# Patient Record
Sex: FEMALE | Race: WHITE | NOT HISPANIC OR LATINO | Employment: FULL TIME | URBAN - METROPOLITAN AREA
[De-identification: names, ages, dates, MRNs, and addresses within clinical notes are randomized per-mention and may not be internally consistent; named-entity substitution may affect disease eponyms.]

---

## 2022-01-27 ENCOUNTER — OFFICE VISIT (OUTPATIENT)
Dept: BARIATRICS | Facility: CLINIC | Age: 62
End: 2022-01-27
Payer: COMMERCIAL

## 2022-01-27 VITALS
HEART RATE: 71 BPM | DIASTOLIC BLOOD PRESSURE: 77 MMHG | RESPIRATION RATE: 16 BRPM | HEIGHT: 65 IN | WEIGHT: 265.3 LBS | BODY MASS INDEX: 44.2 KG/M2 | SYSTOLIC BLOOD PRESSURE: 137 MMHG | TEMPERATURE: 97 F

## 2022-01-27 DIAGNOSIS — G47.33 OSA (OBSTRUCTIVE SLEEP APNEA): ICD-10-CM

## 2022-01-27 DIAGNOSIS — R73.01 ELEVATED FASTING GLUCOSE: ICD-10-CM

## 2022-01-27 DIAGNOSIS — E66.01 OBESITY, CLASS III, BMI 40-49.9 (MORBID OBESITY) (HCC): Primary | ICD-10-CM

## 2022-01-27 PROCEDURE — 99205 OFFICE O/P NEW HI 60 MIN: CPT | Performed by: PHYSICIAN ASSISTANT

## 2022-01-27 RX ORDER — BETAMETHASONE DIPROPIONATE 0.5 MG/G
1 CREAM TOPICAL DAILY
COMMUNITY
Start: 2022-01-12

## 2022-01-27 NOTE — PROGRESS NOTES
Assessment/Plan:    Obesity, Class III, BMI 40-49 9 (morbid obesity) ([address] )  -Discussed options of [facility] Lifestyle Intervention Program, Very Low Calorie Diet-VLCD, Conservative Program, Zaid-En-Y Gastric Bypass and Vertical Sleeve Gastrectomy and the role of weight loss medications   -Initial weight loss goal of 5-10% weight loss for improved health  -Screening labs  Recommend checking lab coverage before having labs drawn  -NEEDS Lipids, tsh, a1c, cmp, fasting insulin   -Patient is interested in pursuing [facility] Intervention Program        JANICE (obstructive sleep apnea)  -encouraged continued use of CPAP machine  -may improve with 20-30% weight loss        Follow up in approximately 2 weeks with Non-Surgical Physician/Advanced Practitioner  Diagnoses and all orders for this visit:    Obesity, Class III, BMI 40-49 9 (morbid obesity) ([facility])  -     Hemoglobin A1C; Future  -     Insulin, fasting; Future    JANICE (obstructive sleep apnea)  -     Hemoglobin A1C; Future  -     Insulin, fasting; Future    Elevated fasting glucose  -     Hemoglobin A1C; Future  -     Insulin, fasting; Future    Other orders  -     betamethasone, augmented, (DIPROLENE-AF) 0 05 % cream; Apply 1 application topically in the morning  -     Diclofenac Sodium (VOLTAREN) 1 %; Apply 1 g topically daily as needed          Subjective:   Chief Complaint   Patient presents with    Consult     MWM consult        Patient ID: Jen Lee  is a 63 y o  female with excess weight/obesity here to pursue weight management  Past Medical History:   Diagnosis Date    PCOS (polycystic ovarian syndrome)     Sleep apnea        HPI:    Obesity/Excess Weight:  Severity: Severe  Onset: since childhood     Modifiers: Diet and Exercise and Commercial Weight Loss Programs-ie  Weight Watchers, [employer], Nutrisystem, etc   Contributing factors:  Insufficient Physical Activity, Stress/Emotional Eating and genetics and pcos  Associated symptoms: decreased self esteem and feels uncomfortable     Goals: under 200 lbs   Hydration: 8 glasses of water, smoothie-almond milk, spinach, stevia, protein powder, bee pollen ,flax seed, berries   Alcohol: 2 drinks per month     The following portions of the patient's history were reviewed and updated as appropriate: allergies, current medications, past family history, past medical history, past social history, past surgical history and problem list     Review of Systems   HENT: Negative for sore throat  Respiratory: Negative for cough and shortness of breath  Cardiovascular: Negative for chest pain and palpitations  Gastrointestinal: Negative for abdominal pain, constipation, diarrhea, nausea and vomiting  Denies GERD   Endocrine: Negative for cold intolerance and heat intolerance  Genitourinary: Negative for dysuria  Musculoskeletal: Negative for arthralgias and back pain  Skin: Negative for rash  Neurological: Negative for headaches  Psychiatric/Behavioral: Negative for suicidal ideas (denies HI)  + Depression and anxiety-controlled        Objective:    /77 (BP Location: Left arm, Patient Position: Sitting, Cuff Size: Adult)   Pulse 71   Temp (!) 97 °F (36 1 °C) (Tympanic)   Resp 16   Ht 5' 5 3" (1 659 m)   Wt 120 kg (265 lb 4 8 oz)   BMI 43 74 kg/m²     Physical Exam  Vitals and nursing note reviewed  Constitutional   General appearance: Abnormal   well developed and morbidly obese  Eyes No conjunctival injection   Pulmonary   Respiratory effort: No increased work of breathing or signs of respiratory distress  Abdomen   Abdomen: Abnormal   The abdomen was obese  Musculoskeletal   Gait and station: Normal     Skin  Dry   No visible rashes   Psychiatric   Orientation to person, place and time: Normal     Affect: appropriate  Neurological   Normal gait     70 minute visit, >50% time spent counseling patient on diet behavior and exercise modification for weight loss

## 2022-01-27 NOTE — ASSESSMENT & PLAN NOTE
-Discussed options of [facility] Lifestyle Intervention Program, Very Low Calorie Diet-VLCD, Conservative Program, Zaid-En-Y Gastric Bypass and Vertical Sleeve Gastrectomy and the role of weight loss medications   -Initial weight loss goal of 5-10% weight loss for improved health  -Screening labs  Recommend checking lab coverage before having labs drawn    -NEEDS Lipids, tsh, a1c, cmp, fasting insulin   -Patient is interested in pursuing [facility] Lifestyle Intervention Program